# Patient Record
Sex: FEMALE | Race: WHITE | Employment: UNEMPLOYED | URBAN - METROPOLITAN AREA
[De-identification: names, ages, dates, MRNs, and addresses within clinical notes are randomized per-mention and may not be internally consistent; named-entity substitution may affect disease eponyms.]

---

## 2020-09-02 ENCOUNTER — HOSPITAL ENCOUNTER (OUTPATIENT)
Age: 30
Discharge: HOME | End: 2020-09-02
Payer: MEDICAID

## 2020-09-03 PROCEDURE — 93005 ELECTROCARDIOGRAM TRACING: CPT | Performed by: PREVENTIVE MEDICINE

## 2020-09-04 LAB
EKG ATRIAL RATE: 105 BPM
EKG P AXIS: 47 DEGREES
EKG P-R INTERVAL: 114 MS
EKG Q-T INTERVAL: 342 MS
EKG QRS DURATION: 76 MS
EKG QTC CALCULATION (BAZETT): 452 MS
EKG R AXIS: 90 DEGREES
EKG T AXIS: 47 DEGREES
EKG VENTRICULAR RATE: 105 BPM

## 2020-09-24 ENCOUNTER — HOSPITAL ENCOUNTER (EMERGENCY)
Age: 30
Discharge: HOME | End: 2020-09-24
Attending: EMERGENCY MEDICINE
Payer: MEDICAID

## 2020-09-24 VITALS
WEIGHT: 114 LBS | TEMPERATURE: 98.3 F | RESPIRATION RATE: 16 BRPM | SYSTOLIC BLOOD PRESSURE: 126 MMHG | BODY MASS INDEX: 20.98 KG/M2 | HEART RATE: 108 BPM | DIASTOLIC BLOOD PRESSURE: 53 MMHG | OXYGEN SATURATION: 100 % | HEIGHT: 62 IN

## 2020-09-24 LAB
DIRECT EXAM: ABNORMAL
Lab: ABNORMAL
PREGNANCY TEST URINE, POC: NEGATIVE
SPECIMEN DESCRIPTION: ABNORMAL

## 2020-09-24 PROCEDURE — 87491 CHLMYD TRACH DNA AMP PROBE: CPT

## 2020-09-24 PROCEDURE — 99283 EMERGENCY DEPT VISIT LOW MDM: CPT

## 2020-09-24 PROCEDURE — 87660 TRICHOMONAS VAGIN DIR PROBE: CPT

## 2020-09-24 PROCEDURE — 96372 THER/PROPH/DIAG INJ SC/IM: CPT

## 2020-09-24 PROCEDURE — 6370000000 HC RX 637 (ALT 250 FOR IP): Performed by: EMERGENCY MEDICINE

## 2020-09-24 PROCEDURE — 87591 N.GONORRHOEAE DNA AMP PROB: CPT

## 2020-09-24 PROCEDURE — 87510 GARDNER VAG DNA DIR PROBE: CPT

## 2020-09-24 PROCEDURE — 87480 CANDIDA DNA DIR PROBE: CPT

## 2020-09-24 PROCEDURE — 6360000002 HC RX W HCPCS: Performed by: EMERGENCY MEDICINE

## 2020-09-24 RX ORDER — METRONIDAZOLE 500 MG/1
2000 TABLET ORAL ONCE
Status: COMPLETED | OUTPATIENT
Start: 2020-09-24 | End: 2020-09-24

## 2020-09-24 RX ORDER — QUETIAPINE FUMARATE 25 MG/1
25 TABLET, FILM COATED ORAL 2 TIMES DAILY
COMMUNITY

## 2020-09-24 RX ORDER — CEFTRIAXONE SODIUM 250 MG/1
250 INJECTION, POWDER, FOR SOLUTION INTRAMUSCULAR; INTRAVENOUS ONCE
Status: COMPLETED | OUTPATIENT
Start: 2020-09-24 | End: 2020-09-24

## 2020-09-24 RX ORDER — AZITHROMYCIN 250 MG/1
1000 TABLET, FILM COATED ORAL ONCE
Status: COMPLETED | OUTPATIENT
Start: 2020-09-24 | End: 2020-09-24

## 2020-09-24 RX ORDER — HYDROXYZINE HYDROCHLORIDE 25 MG/1
25 TABLET, FILM COATED ORAL 3 TIMES DAILY PRN
COMMUNITY

## 2020-09-24 RX ADMIN — CEFTRIAXONE SODIUM 250 MG: 250 INJECTION, POWDER, FOR SOLUTION INTRAMUSCULAR; INTRAVENOUS at 11:32

## 2020-09-24 RX ADMIN — METRONIDAZOLE 2000 MG: 500 TABLET ORAL at 11:32

## 2020-09-24 RX ADMIN — AZITHROMYCIN MONOHYDRATE 1000 MG: 250 TABLET ORAL at 11:32

## 2020-09-24 ASSESSMENT — ENCOUNTER SYMPTOMS
NAUSEA: 0
SHORTNESS OF BREATH: 0
RHINORRHEA: 0
EYE DISCHARGE: 0
VOMITING: 0
DIARRHEA: 0
COUGH: 0
COLOR CHANGE: 0
EYE REDNESS: 0
SORE THROAT: 0

## 2020-09-24 NOTE — ED PROVIDER NOTES
EMERGENCY DEPARTMENT ENCOUNTER    Pt Name: John Nolasco  MRN: [id number]  [place] 1990  Date of evaluation: 20  CHIEF COMPLAINT       Chief Complaint   Patient presents with    Exposure to STD     HISTORY OF PRESENT ILLNESS   This is a 27-year-old female that presents with complaints of vaginal discharge and exposure to an STD. Patient states that she was a previous drug addict, she states that she was engaged in some high risk sexual behavior. Patient states that 1 of her partners were tested positive for trichomonas. The patient describes vaginal discharge with no pain, she denies any dysuria or hematuria, she has no fevers or chills. REVIEW OF SYSTEMS     Review of Systems   Constitutional: Negative for chills and fever. HENT: Negative for rhinorrhea and sore throat. Eyes: Negative for discharge, redness and visual disturbance. Respiratory: Negative for cough and shortness of breath. Cardiovascular: Negative for chest pain, palpitations and leg swelling. Gastrointestinal: Negative for diarrhea, nausea and vomiting. Genitourinary: Positive for vaginal discharge. Negative for dyspareunia, dysuria, hematuria and vaginal bleeding. Musculoskeletal: Negative for arthralgias, myalgias and neck pain. Skin: Negative for color change and rash. Neurological: Negative for seizures, weakness and headaches. Psychiatric/Behavioral: Negative for hallucinations, self-injury and suicidal ideas. PASTMEDICAL HISTORY     Past Medical History:   Diagnosis Date    Drug abuse [facility])      Past Problem List  There is no problem list on file for this patient.     SURGICAL HISTORY       Past Surgical History:   Procedure Laterality Date     SECTION       CURRENT MEDICATIONS       Previous Medications    HYDROXYZINE (ATARAX) 25 MG TABLET    Take 25 mg by mouth 3 times daily as needed for Itching    QUETIAPINE (SEROQUEL) 25 MG TABLET    Take 25 mg by mouth 2 times daily ALLERGIES     has No Known Allergies. FAMILY HISTORY     has no family status information on file. SOCIAL HISTORY       Social History     Tobacco Use    Smoking status: Never Smoker    Smokeless tobacco: Never Used   Substance Use Topics    Alcohol use: Never    Drug use: Not Currently     Comment: heroin abuse, in rehab ([place])     PHYSICAL EXAM     INITIAL VITALS: BP (!) 126/53   Pulse 108   Temp 98.3 °F (36.8 °C) (Oral)   Resp 16   Ht 5' 2\" (1.575 m)   Wt 114 lb (51.7 kg)   LMP 09/10/2020   SpO2 100%   BMI 20.85 kg/m²    Physical Exam  Exam conducted with a chaperone present. Constitutional:       Appearance: Normal appearance. HENT:      Head: Normocephalic and atraumatic. Eyes:      Extraocular Movements: Extraocular movements intact. Pupils: Pupils are equal, round, and reactive to light. Cardiovascular:      Rate and Rhythm: Normal rate and regular rhythm. Pulmonary:      Effort: Pulmonary effort is normal.      Breath sounds: Normal breath sounds. Abdominal:      General: Abdomen is flat. Palpations: Abdomen is soft. Tenderness: There is no abdominal tenderness. Genitourinary:     General: Normal vulva. Exam position: Lithotomy position. Labia:         Right: No rash, tenderness, lesion or injury. Left: No rash, tenderness, lesion or injury. Vagina: No signs of injury and foreign body. Vaginal discharge present. No erythema, tenderness, bleeding or lesions. Cervix: Normal.      Uterus: Normal.       Adnexa: Right adnexa normal and left adnexa normal.   Neurological:      Mental Status: She is alert. MEDICAL DECISION MAKIN-year-old female, pelvic examination has a moderate discharge, the patient has a known STD exposure. Plan is treating presumptively for STIs and reevaluation.          CRITICAL CARE:       PROCEDURES:    Procedures    DIAGNOSTIC RESULTS   EKG:All EKG's are interpreted by the Emergency Department Physician who either signs or Co-signs this chart in the absence of a cardiologist.        RADIOLOGY:All plain film, CT, MRI, and formal ultrasound images (except ED bedside ultrasound) are read by the radiologist, see reports below, unless otherwisenoted in MDM or here. No orders to display     LABS: All lab results were reviewed by myself, and all abnormals are listed below. Labs Reviewed   VAGINITIS DNA PROBE - Abnormal; Notable for the following components:       Result Value    Direct Exam POSITIVE for Trichomonas vaginalis (*)     Direct Exam POSITIVE for Gardnerella vaginalis. (*)     All other components within normal limits   POCT URINE PREGNANCY - Normal   C.TRACHOMATIS N.GONORRHOEAE DNA       EMERGENCY DEPARTMENTCOURSE:         Vitals:    Vitals:    09/24/20 0940   BP: (!) 126/53   Pulse: 108   Resp: 16   Temp: 98.3 °F (36.8 °C)   TempSrc: Oral   SpO2: 100%   Weight: 114 lb (51.7 kg)   Height: 5' 2\" (1.575 m)       The patient was given the following medications while in the emergency department:  Orders Placed This Encounter   Medications    cefTRIAXone (ROCEPHIN) injection 250 mg     Order Specific Question:   Antimicrobial Indications     Answer:   STD infection    azithromycin (ZITHROMAX) tablet 1,000 mg    metroNIDAZOLE (FLAGYL) tablet 2,000 mg     Order Specific Question:   Antimicrobial Indications     Answer:   STD infection     CONSULTS:  None    FINAL IMPRESSION      1.  STD exposure          DISPOSITION/PLAN   DISPOSITION Decision To Discharge 09/24/2020 11:22:02 AM      PATIENT REFERRED TO:  [place] [address]  [phone]  Schedule an appointment as soon as possible for a visit in 2 days      DISCHARGE MEDICATIONS:  New Prescriptions    No medications on file     Ayse Salazar MD  Attending Emergency Physician                   Ayse Salazar MD  09/24/20 3575

## 2020-09-25 LAB
C TRACH DNA GENITAL QL NAA+PROBE: NEGATIVE
N. GONORRHOEAE DNA: NEGATIVE
SPECIMEN DESCRIPTION: NORMAL